# Patient Record
Sex: FEMALE | Race: WHITE | NOT HISPANIC OR LATINO
[De-identification: names, ages, dates, MRNs, and addresses within clinical notes are randomized per-mention and may not be internally consistent; named-entity substitution may affect disease eponyms.]

---

## 2017-02-23 ENCOUNTER — MEDICATION RENEWAL (OUTPATIENT)
Age: 47
End: 2017-02-23

## 2017-03-07 ENCOUNTER — CLINICAL ADVICE (OUTPATIENT)
Age: 47
End: 2017-03-07

## 2017-04-07 ENCOUNTER — MEDICATION RENEWAL (OUTPATIENT)
Age: 47
End: 2017-04-07

## 2017-04-12 ENCOUNTER — MEDICATION RENEWAL (OUTPATIENT)
Age: 47
End: 2017-04-12

## 2017-04-14 ENCOUNTER — MEDICATION RENEWAL (OUTPATIENT)
Age: 47
End: 2017-04-14

## 2017-05-11 ENCOUNTER — APPOINTMENT (OUTPATIENT)
Dept: ENDOCRINOLOGY | Facility: CLINIC | Age: 47
End: 2017-05-11

## 2017-05-11 VITALS
SYSTOLIC BLOOD PRESSURE: 104 MMHG | HEART RATE: 70 BPM | DIASTOLIC BLOOD PRESSURE: 64 MMHG | BODY MASS INDEX: 24.88 KG/M2 | HEIGHT: 63 IN | WEIGHT: 140.44 LBS

## 2017-05-11 RX ORDER — METFORMIN HYDROCHLORIDE 500 MG/1
500 TABLET, COATED ORAL TWICE DAILY
Qty: 180 | Refills: 1 | Status: DISCONTINUED | COMMUNITY
Start: 2017-03-07 | End: 2017-05-11

## 2017-05-12 LAB — TSH SERPL-ACNC: 0.65 UIU/ML

## 2017-06-12 ENCOUNTER — RX RENEWAL (OUTPATIENT)
Age: 47
End: 2017-06-12

## 2017-12-05 ENCOUNTER — APPOINTMENT (OUTPATIENT)
Dept: ENDOCRINOLOGY | Facility: CLINIC | Age: 47
End: 2017-12-05
Payer: COMMERCIAL

## 2017-12-05 VITALS
WEIGHT: 146 LBS | BODY MASS INDEX: 25.87 KG/M2 | DIASTOLIC BLOOD PRESSURE: 75 MMHG | HEART RATE: 82 BPM | HEIGHT: 63 IN | SYSTOLIC BLOOD PRESSURE: 121 MMHG

## 2017-12-05 DIAGNOSIS — Z00.00 ENCOUNTER FOR GENERAL ADULT MEDICAL EXAMINATION W/OUT ABNORMAL FINDINGS: ICD-10-CM

## 2017-12-05 PROCEDURE — 99213 OFFICE O/P EST LOW 20 MIN: CPT | Mod: 25

## 2017-12-05 PROCEDURE — 36415 COLL VENOUS BLD VENIPUNCTURE: CPT

## 2017-12-07 LAB
24R-OH-CALCIDIOL SERPL-MCNC: 40.2 PG/ML
25(OH)D3 SERPL-MCNC: 19.2 NG/ML
ANION GAP SERPL CALC-SCNC: 15 MMOL/L
BUN SERPL-MCNC: 15 MG/DL
CALCIUM SERPL-MCNC: 10.2 MG/DL
CALCIUM SERPL-MCNC: 10.2 MG/DL
CHLORIDE SERPL-SCNC: 99 MMOL/L
CO2 SERPL-SCNC: 28 MMOL/L
CREAT SERPL-MCNC: 0.56 MG/DL
FERRITIN SERPL-MCNC: 53 NG/ML
GLUCOSE SERPL-MCNC: 107 MG/DL
HCT VFR BLD CALC: 39.4 %
HGB BLD-MCNC: 12.8 G/DL
PARATHYROID HORMONE INTACT: 22 PG/ML
POTASSIUM SERPL-SCNC: 4.5 MMOL/L
SODIUM SERPL-SCNC: 142 MMOL/L
TSH SERPL-ACNC: 1.12 UIU/ML

## 2018-01-08 ENCOUNTER — RX RENEWAL (OUTPATIENT)
Age: 48
End: 2018-01-08

## 2018-06-04 ENCOUNTER — RX RENEWAL (OUTPATIENT)
Age: 48
End: 2018-06-04

## 2018-11-07 ENCOUNTER — TRANSCRIPTION ENCOUNTER (OUTPATIENT)
Age: 48
End: 2018-11-07

## 2019-05-08 ENCOUNTER — APPOINTMENT (OUTPATIENT)
Dept: ENDOCRINOLOGY | Facility: CLINIC | Age: 49
End: 2019-05-08

## 2019-06-03 ENCOUNTER — RX RENEWAL (OUTPATIENT)
Age: 49
End: 2019-06-03

## 2019-07-10 ENCOUNTER — APPOINTMENT (OUTPATIENT)
Dept: INTERNAL MEDICINE | Facility: CLINIC | Age: 49
End: 2019-07-10

## 2019-07-19 ENCOUNTER — RX RENEWAL (OUTPATIENT)
Age: 49
End: 2019-07-19

## 2019-08-06 ENCOUNTER — APPOINTMENT (OUTPATIENT)
Dept: INTERNAL MEDICINE | Facility: CLINIC | Age: 49
End: 2019-08-06

## 2019-08-21 ENCOUNTER — RX RENEWAL (OUTPATIENT)
Age: 49
End: 2019-08-21

## 2019-09-10 ENCOUNTER — APPOINTMENT (OUTPATIENT)
Dept: INTERNAL MEDICINE | Facility: CLINIC | Age: 49
End: 2019-09-10
Payer: COMMERCIAL

## 2019-09-10 VITALS
TEMPERATURE: 99.5 F | SYSTOLIC BLOOD PRESSURE: 118 MMHG | HEART RATE: 72 BPM | DIASTOLIC BLOOD PRESSURE: 60 MMHG | HEIGHT: 63 IN | BODY MASS INDEX: 25.69 KG/M2 | WEIGHT: 145 LBS

## 2019-09-10 DIAGNOSIS — Z80.0 FAMILY HISTORY OF MALIGNANT NEOPLASM OF DIGESTIVE ORGANS: ICD-10-CM

## 2019-09-10 DIAGNOSIS — N20.0 CALCULUS OF KIDNEY: ICD-10-CM

## 2019-09-10 PROCEDURE — 99204 OFFICE O/P NEW MOD 45 MIN: CPT

## 2019-09-10 RX ORDER — BUPROPION HYDROCHLORIDE 100 MG/1
100 TABLET, FILM COATED, EXTENDED RELEASE ORAL
Qty: 45 | Refills: 0 | Status: DISCONTINUED | COMMUNITY
Start: 2017-05-11 | End: 2019-09-10

## 2019-09-10 NOTE — HISTORY OF PRESENT ILLNESS
[FreeTextEntry8] : here to establish care  , has anxiety  for a while ,now interfering with her life (single mother of 3 yo twin girls)\par moved to [place] last year

## 2019-09-17 ENCOUNTER — CLINICAL ADVICE (OUTPATIENT)
Age: 49
End: 2019-09-17

## 2019-09-18 ENCOUNTER — RX RENEWAL (OUTPATIENT)
Age: 49
End: 2019-09-18

## 2019-09-25 ENCOUNTER — RX RENEWAL (OUTPATIENT)
Age: 49
End: 2019-09-25

## 2019-10-08 ENCOUNTER — APPOINTMENT (OUTPATIENT)
Dept: INTERNAL MEDICINE | Facility: CLINIC | Age: 49
End: 2019-10-08
Payer: COMMERCIAL

## 2019-10-08 VITALS
WEIGHT: 143 LBS | HEIGHT: 63 IN | TEMPERATURE: 98.4 F | SYSTOLIC BLOOD PRESSURE: 122 MMHG | DIASTOLIC BLOOD PRESSURE: 70 MMHG | BODY MASS INDEX: 25.34 KG/M2 | HEART RATE: 68 BPM

## 2019-10-08 DIAGNOSIS — E88.81 METABOLIC SYNDROME: ICD-10-CM

## 2019-10-08 DIAGNOSIS — Z00.01 ENCOUNTER FOR GENERAL ADULT MEDICAL EXAMINATION WITH ABNORMAL FINDINGS: ICD-10-CM

## 2019-10-08 DIAGNOSIS — Z76.89 PERSONS ENCOUNTERING HEALTH SERVICES IN OTHER SPECIFIED CIRCUMSTANCES: ICD-10-CM

## 2019-10-08 PROCEDURE — 90674 CCIIV4 VAC NO PRSV 0.5 ML IM: CPT

## 2019-10-08 PROCEDURE — G0008: CPT

## 2019-10-08 PROCEDURE — 99396 PREV VISIT EST AGE 40-64: CPT | Mod: 25

## 2019-10-08 PROCEDURE — 36415 COLL VENOUS BLD VENIPUNCTURE: CPT

## 2019-10-08 NOTE — HISTORY OF PRESENT ILLNESS
[de-identified] : here for annual exam doing well, \par concerned about cardiac risk factors and need  for further eval

## 2019-10-08 NOTE — PHYSICAL EXAM
[Normal] : no joint swelling, grossly normal strength/tone [de-identified] : no LLE pathology [de-identified] : bilat normal. no lumps [de-identified] : no rashes

## 2019-10-08 NOTE — HEALTH RISK ASSESSMENT
[Good] : ~his/her~  mood as  good [Yes] : Yes [0] : 2) Feeling down, depressed, or hopeless: Not at all (0) [Patient reported mammogram was normal] : Patient reported mammogram was normal [Patient reported PAP Smear was normal] : Patient reported PAP Smear was normal [HIV test declined] : HIV test declined [With Family] : lives with family [Employed] : employed [College] : College [Single] : single [Feels Safe at Home] : Feels safe at home [With Patient/Caregiver] : With Patient/Caregiver [Name: ___] : Health Care Proxy's Name: [unfilled]  [Relationship: ___] : Relationship: [unfilled] [Designated Healthcare Proxy] : Designated healthcare proxy [] : No [[id number]] : 0 [MammogramDate] : 01/19 [MammogramComments] : dr Padilla [PapSmearDate] : 01/19 [AdvancecareDate] : 10/08/19

## 2019-10-09 LAB
ALBUMIN SERPL ELPH-MCNC: 4.3 G/DL
ALP BLD-CCNC: 51 U/L
ALT SERPL-CCNC: 10 U/L
ANION GAP SERPL CALC-SCNC: 9 MMOL/L
APPEARANCE: CLEAR
AST SERPL-CCNC: 12 U/L
BASOPHILS # BLD AUTO: 0.05 K/UL
BASOPHILS NFR BLD AUTO: 0.9 %
BILIRUB SERPL-MCNC: 0.3 MG/DL
BILIRUBIN URINE: NEGATIVE
BLOOD URINE: NEGATIVE
BUN SERPL-MCNC: 12 MG/DL
CALCIUM SERPL-MCNC: 9.2 MG/DL
CHLORIDE SERPL-SCNC: 103 MMOL/L
CHOLEST SERPL-MCNC: 214 MG/DL
CHOLEST/HDLC SERPL: 4.4 RATIO
CO2 SERPL-SCNC: 27 MMOL/L
COLOR: YELLOW
CREAT SERPL-MCNC: 0.65 MG/DL
EOSINOPHIL # BLD AUTO: 0.26 K/UL
EOSINOPHIL NFR BLD AUTO: 4.6 %
ESTIMATED AVERAGE GLUCOSE: 114 MG/DL
GLUCOSE QUALITATIVE U: NEGATIVE
GLUCOSE SERPL-MCNC: 105 MG/DL
HBA1C MFR BLD HPLC: 5.6 %
HBV SURFACE AB SER QL: NONREACTIVE
HCT VFR BLD CALC: 38.4 %
HDLC SERPL-MCNC: 49 MG/DL
HGB BLD-MCNC: 12 G/DL
IMM GRANULOCYTES NFR BLD AUTO: 0.2 %
KETONES URINE: NEGATIVE
LDLC SERPL CALC-MCNC: 145 MG/DL
LEUKOCYTE ESTERASE URINE: NEGATIVE
LYMPHOCYTES # BLD AUTO: 2.46 K/UL
LYMPHOCYTES NFR BLD AUTO: 43.5 %
MAN DIFF?: NORMAL
MCHC RBC-ENTMCNC: 28.8 PG
MCHC RBC-ENTMCNC: 31.3 GM/DL
MCV RBC AUTO: 92.3 FL
MONOCYTES # BLD AUTO: 0.45 K/UL
MONOCYTES NFR BLD AUTO: 8 %
NEUTROPHILS # BLD AUTO: 2.43 K/UL
NEUTROPHILS NFR BLD AUTO: 42.8 %
NITRITE URINE: NEGATIVE
PH URINE: 7.5
PLATELET # BLD AUTO: 263 K/UL
POTASSIUM SERPL-SCNC: 3.8 MMOL/L
PROT SERPL-MCNC: 6.7 G/DL
PROTEIN URINE: NORMAL
RBC # BLD: 4.16 M/UL
RBC # FLD: 12.4 %
SODIUM SERPL-SCNC: 139 MMOL/L
SPECIFIC GRAVITY URINE: 1.02
T4 FREE SERPL-MCNC: 1.8 NG/DL
TRIGL SERPL-MCNC: 98 MG/DL
TSH SERPL-ACNC: 0.36 UIU/ML
UROBILINOGEN URINE: NORMAL
WBC # FLD AUTO: 5.66 K/UL

## 2019-10-16 ENCOUNTER — CLINICAL ADVICE (OUTPATIENT)
Age: 49
End: 2019-10-16

## 2019-10-25 ENCOUNTER — RX RENEWAL (OUTPATIENT)
Age: 49
End: 2019-10-25

## 2019-12-16 ENCOUNTER — RX RENEWAL (OUTPATIENT)
Age: 49
End: 2019-12-16

## 2020-03-04 ENCOUNTER — RX RENEWAL (OUTPATIENT)
Age: 50
End: 2020-03-04

## 2020-03-06 ENCOUNTER — RX RENEWAL (OUTPATIENT)
Age: 50
End: 2020-03-06

## 2020-06-03 ENCOUNTER — RX RENEWAL (OUTPATIENT)
Age: 50
End: 2020-06-03

## 2020-06-19 ENCOUNTER — APPOINTMENT (OUTPATIENT)
Dept: INTERNAL MEDICINE | Facility: CLINIC | Age: 50
End: 2020-06-19
Payer: COMMERCIAL

## 2020-06-19 VITALS
DIASTOLIC BLOOD PRESSURE: 60 MMHG | SYSTOLIC BLOOD PRESSURE: 96 MMHG | HEIGHT: 63 IN | HEART RATE: 89 BPM | WEIGHT: 145 LBS | BODY MASS INDEX: 25.69 KG/M2 | TEMPERATURE: 98.8 F | OXYGEN SATURATION: 98 %

## 2020-06-19 DIAGNOSIS — Z11.59 ENCOUNTER FOR SCREENING FOR OTHER VIRAL DISEASES: ICD-10-CM

## 2020-06-19 DIAGNOSIS — G56.22 LESION OF ULNAR NERVE, LEFT UPPER LIMB: ICD-10-CM

## 2020-06-19 PROCEDURE — 36415 COLL VENOUS BLD VENIPUNCTURE: CPT

## 2020-06-19 PROCEDURE — 99213 OFFICE O/P EST LOW 20 MIN: CPT

## 2020-06-19 RX ORDER — ESCITALOPRAM OXALATE 10 MG/1
10 TABLET ORAL DAILY
Qty: 90 | Refills: 0 | Status: DISCONTINUED | COMMUNITY
Start: 2019-09-10 | End: 2020-06-19

## 2020-06-19 RX ORDER — DULOXETINE HYDROCHLORIDE 30 MG/1
30 CAPSULE, DELAYED RELEASE ORAL
Qty: 90 | Refills: 0 | Status: ACTIVE | COMMUNITY
Start: 2020-06-19

## 2020-06-19 NOTE — PHYSICAL EXAM
[No Edema] : there was no peripheral edema [Normal] : normal rate, regular rhythm, normal S1 and S2 and no murmur heard [Coordination Grossly Intact] : coordination grossly intact [No Joint Swelling] : no joint swelling [Grossly Normal Strength/Tone] : grossly normal strength/tone [de-identified] : L axillary lump nontender [de-identified] : ulnar neuropathy

## 2020-06-19 NOTE — HISTORY OF PRESENT ILLNESS
[de-identified] : here for medical evaluation for possible Synthroid  dose increased, was seen by cardiologist after brother had heart attack at age 42,\par she also noted a L axillary lump , seen by dr Espino and is scheduled to get mammo and US\par  also needs neuro to do EMG for ulnar neuropathy

## 2020-06-19 NOTE — REVIEW OF SYSTEMS
[Fever] : no fever [Fatigue] : fatigue [Chills] : no chills [Anxiety] : anxiety [Negative] : Respiratory [de-identified] : L axillary lump [FreeTextEntry9] : fingers numbness

## 2020-06-22 LAB
SARS-COV-2 IGG SERPL IA-ACNC: <0.1 INDEX
SARS-COV-2 IGG SERPL QL IA: NEGATIVE
T4 FREE SERPL-MCNC: 1.4 NG/DL
TSH SERPL-ACNC: 1.88 UIU/ML

## 2020-07-31 ENCOUNTER — APPOINTMENT (OUTPATIENT)
Dept: FAMILY MEDICINE | Facility: CLINIC | Age: 50
End: 2020-07-31
Payer: COMMERCIAL

## 2020-07-31 VITALS
SYSTOLIC BLOOD PRESSURE: 120 MMHG | DIASTOLIC BLOOD PRESSURE: 76 MMHG | TEMPERATURE: 98.8 F | WEIGHT: 141 LBS | HEIGHT: 63 IN | BODY MASS INDEX: 24.98 KG/M2

## 2020-07-31 PROCEDURE — 99215 OFFICE O/P EST HI 40 MIN: CPT

## 2020-07-31 RX ORDER — FLUTICASONE PROPIONATE 50 UG/1
50 SPRAY, METERED NASAL TWICE DAILY
Qty: 1 | Refills: 5 | Status: DISCONTINUED | COMMUNITY
Start: 2020-03-12 | End: 2020-07-31

## 2020-07-31 NOTE — PHYSICAL EXAM
[No Lymphadenopathy] : no lymphadenopathy [PERRL] : pupils equal round and reactive to light [Clear to Auscultation] : lungs were clear to auscultation bilaterally [Thyroid Normal, No Nodules] : the thyroid was normal and there were no nodules present [Normal Rate] : normal rate  [Regular Rhythm] : with a regular rhythm [No Focal Deficits] : no focal deficits [No Carotid Bruits] : no carotid bruits [Normal] : affect was normal and insight and judgment were intact [de-identified] : negative Romberg; normal finger to nose. Able to walk toe-heel. [de-identified] : strong carotid pp [de-identified] : No horizontal nystagmus

## 2020-07-31 NOTE — PLAN
[FreeTextEntry1] : Reassurance.\par Use meclizine as needed\par Avoid getting up too quickly or sudden head turns.\par Neuro f/u if her sxs persist or worsen.\par \par

## 2020-07-31 NOTE — REVIEW OF SYSTEMS
[Headache] : headache [Anxiety] : anxiety [Dizziness] : dizziness [Negative] : Respiratory [Vision Problems] : no vision problems [de-identified] : numbness of the left side of her face

## 2020-07-31 NOTE — HEALTH RISK ASSESSMENT
[No] : In the past 12 months have you used drugs other than those required for medical reasons? No [No falls in past year] : Patient reported no falls in the past year [0] : 2) Feeling down, depressed, or hopeless: Not at all (0) [] : No [de-identified] : walking [de-identified] : normal

## 2020-09-03 ENCOUNTER — APPOINTMENT (OUTPATIENT)
Dept: NEUROLOGY | Facility: CLINIC | Age: 50
End: 2020-09-03
Payer: COMMERCIAL

## 2020-09-03 DIAGNOSIS — R20.2 ANESTHESIA OF SKIN: ICD-10-CM

## 2020-09-03 DIAGNOSIS — R20.0 ANESTHESIA OF SKIN: ICD-10-CM

## 2020-09-03 PROCEDURE — 95909 NRV CNDJ TST 5-6 STUDIES: CPT

## 2020-09-03 PROCEDURE — 95886 MUSC TEST DONE W/N TEST COMP: CPT

## 2020-09-17 ENCOUNTER — RX RENEWAL (OUTPATIENT)
Age: 50
End: 2020-09-17

## 2020-09-21 ENCOUNTER — RX RENEWAL (OUTPATIENT)
Age: 50
End: 2020-09-21

## 2020-11-11 ENCOUNTER — APPOINTMENT (OUTPATIENT)
Dept: GASTROENTEROLOGY | Facility: CLINIC | Age: 50
End: 2020-11-11
Payer: COMMERCIAL

## 2020-11-11 DIAGNOSIS — Z12.11 ENCOUNTER FOR SCREENING FOR MALIGNANT NEOPLASM OF COLON: ICD-10-CM

## 2020-11-11 DIAGNOSIS — K21.9 GASTRO-ESOPHAGEAL REFLUX DISEASE W/OUT ESOPHAGITIS: ICD-10-CM

## 2020-11-11 PROCEDURE — 99214 OFFICE O/P EST MOD 30 MIN: CPT | Mod: 95

## 2020-11-11 RX ORDER — OMEPRAZOLE 40 MG/1
40 CAPSULE, DELAYED RELEASE ORAL
Qty: 30 | Refills: 2 | Status: ACTIVE | COMMUNITY
Start: 2020-11-11 | End: 1900-01-01

## 2020-11-11 NOTE — HISTORY OF PRESENT ILLNESS
[FreeTextEntry1] : A verbal consent was previously obtained from the patient for this specific telehealth visit (see attached document). Visit done from my office in [place], [place].\par \par \par Time started: 1139\par Time ended: 1154\par Total time (minutes):  30 and > 50% of the time spent in the encounter involved counseling and coordination of care for any or all of the diagnoses submitted \par \par CHIEF COMPLAINT: \par gerd, colon screening\par \par HPI:  \par 50f anxiety, HLD, hypothyroid for evalution of GERD - feels severe burning in chest with pain w/ swallowing x 1 wk.  Had similar after doxy several years ago, but no provoking medication on this occasion.  Sx sever, daily, improve w/ prilosec otc, but increased to bid, and still uncomforatable.  Sx mainly prandial.  No excertional component.  Burning quality.  \par \par No prior colonscopy.   Pt denies abdominal pain, rectal bleeding, change in bowel habits, or unexplained weight loss.  \par \par Soc:  no tobacco or significant EtOH\par FHx: no FHx GI malignancy or IBD\par \par ROS:\par Constitutional:: no weight loss, fevers\par ENT: no deafness\par Eyes: not blind\par Neck: no LN\par Chest: no dyspnea/cough\par Cardiac: no chest pain\par Vascular: no leg swelling\par GI: no abdominal pain, nausea, vomiting, diarrhea, constipation, rectal bleeding, dysphagia, melena unless otherwise noted in HPI\par : no dysuria, dark urine\par Skin: no rashes, jaundice\par Heme: no bleeding\par Endocrine: no DM unless otherwise stated in HPI\par \par Px: telehealth.  NAD\par \par Labs/imaging/prior endoscopic results reviewed to the extent available and noted in HP.  IA verbal consent was previously obtained from the patient for this specific telehealth visit (see attached document). Visit done from my office in [place], [place].   Pt at home. Others present: none. Privacy concerns addressed.\par \par

## 2020-11-11 NOTE — ASSESSMENT
[FreeTextEntry1] : GERD - Reviewed dietary and lifestyle modifications, including weight loss, smaller/frequent/earlier (>3h prior to lying down) meals, trials of cutting down/out caffeine, chocolate, tomatoes, fatty foods, alcohol.  EGD at time of colonoscopy.  Trial carafate as well.\par \par Colon cancer screening - colonoscopy due - Colonoscopy scheduled - Risks, benefits, alternatives were discussed, including but not limited to bleeding, infection, perforation and sedation risks. Additionally, the possibility of missed lesions was conveyed.\par \par

## 2020-12-27 ENCOUNTER — RESULT REVIEW (OUTPATIENT)
Age: 50
End: 2020-12-27

## 2020-12-28 ENCOUNTER — RESULT REVIEW (OUTPATIENT)
Age: 50
End: 2020-12-28

## 2020-12-29 ENCOUNTER — APPOINTMENT (OUTPATIENT)
Dept: GASTROENTEROLOGY | Facility: HOSPITAL | Age: 50
End: 2020-12-29

## 2020-12-29 ENCOUNTER — RESULT REVIEW (OUTPATIENT)
Age: 50
End: 2020-12-29

## 2021-01-29 ENCOUNTER — RX RENEWAL (OUTPATIENT)
Age: 51
End: 2021-01-29

## 2021-03-17 ENCOUNTER — APPOINTMENT (OUTPATIENT)
Dept: INTERNAL MEDICINE | Facility: CLINIC | Age: 51
End: 2021-03-17
Payer: COMMERCIAL

## 2021-03-17 DIAGNOSIS — R42 DIZZINESS AND GIDDINESS: ICD-10-CM

## 2021-03-17 PROCEDURE — 99213 OFFICE O/P EST LOW 20 MIN: CPT | Mod: 95

## 2021-03-17 NOTE — REVIEW OF SYSTEMS
[Abdominal Pain] : no abdominal pain [Nausea] : no nausea [Vomiting] : no vomiting [Headache] : no headache [Dizziness] : dizziness [Memory Loss] : no memory loss [Negative] : Respiratory [de-identified] : see HPI

## 2021-03-17 NOTE — HISTORY OF PRESENT ILLNESS
[Home] : at home, [unfilled] , at the time of the visit. [Medical Office: ([facility])___] : at the medical office located in  [Verbal consent obtained from patient] : the patient, [unfilled] [de-identified] : c/o vertigo , now more often than  prior , when she changes positions and in  bed, no vomiting, no falls , \par Meclizine does not help , \par needs Synthroid refill

## 2021-04-20 ENCOUNTER — RX RENEWAL (OUTPATIENT)
Age: 51
End: 2021-04-20

## 2021-06-16 ENCOUNTER — RX RENEWAL (OUTPATIENT)
Age: 51
End: 2021-06-16

## 2021-06-23 ENCOUNTER — APPOINTMENT (OUTPATIENT)
Dept: INTERNAL MEDICINE | Facility: CLINIC | Age: 51
End: 2021-06-23
Payer: COMMERCIAL

## 2021-06-23 VITALS
HEART RATE: 103 BPM | TEMPERATURE: 97.5 F | HEIGHT: 63 IN | DIASTOLIC BLOOD PRESSURE: 60 MMHG | BODY MASS INDEX: 25.16 KG/M2 | SYSTOLIC BLOOD PRESSURE: 112 MMHG | WEIGHT: 142 LBS | OXYGEN SATURATION: 97 %

## 2021-06-23 DIAGNOSIS — E06.3 AUTOIMMUNE THYROIDITIS: ICD-10-CM

## 2021-06-23 DIAGNOSIS — E78.2 MIXED HYPERLIPIDEMIA: ICD-10-CM

## 2021-06-23 DIAGNOSIS — R09.81 NASAL CONGESTION: ICD-10-CM

## 2021-06-23 DIAGNOSIS — F41.9 ANXIETY DISORDER, UNSPECIFIED: ICD-10-CM

## 2021-06-23 PROCEDURE — 99072 ADDL SUPL MATRL&STAF TM PHE: CPT

## 2021-06-23 PROCEDURE — 99213 OFFICE O/P EST LOW 20 MIN: CPT

## 2021-06-23 RX ORDER — SUCRALFATE 1 G/10ML
1 SUSPENSION ORAL
Qty: 600 | Refills: 0 | Status: DISCONTINUED | COMMUNITY
Start: 2020-11-11 | End: 2021-06-23

## 2021-06-23 RX ORDER — MECLIZINE HYDROCHLORIDE 12.5 MG/1
12.5 TABLET ORAL
Qty: 30 | Refills: 0 | Status: DISCONTINUED | COMMUNITY
Start: 2020-07-31 | End: 2021-06-23

## 2021-06-23 NOTE — HISTORY OF PRESENT ILLNESS
[FreeTextEntry8] : c/o 10 days congestion, sinus pressure and nasal d/c , now more pressure in L ear, denies hearing loss, pain or fever

## 2021-06-23 NOTE — REVIEW OF SYSTEMS
[Nasal Discharge] : nasal discharge [Postnasal Drip] : postnasal drip [Negative] : Respiratory [Fever] : no fever [Chills] : no chills [Earache] : no earache [Hearing Loss] : no hearing loss [Sore Throat] : no sore throat

## 2021-06-23 NOTE — PHYSICAL EXAM
[Normal TMs] : both tympanic membranes were normal [Normal] : normal gait, coordination grossly intact, no focal deficits and deep tendon reflexes were 2+ and symmetric

## 2021-07-02 ENCOUNTER — RX RENEWAL (OUTPATIENT)
Age: 51
End: 2021-07-02

## 2021-08-30 ENCOUNTER — RX RENEWAL (OUTPATIENT)
Age: 51
End: 2021-08-30

## 2021-09-13 ENCOUNTER — RX RENEWAL (OUTPATIENT)
Age: 51
End: 2021-09-13

## 2021-09-22 ENCOUNTER — RX RENEWAL (OUTPATIENT)
Age: 51
End: 2021-09-22

## 2021-12-02 ENCOUNTER — NON-APPOINTMENT (OUTPATIENT)
Age: 51
End: 2021-12-02

## 2021-12-03 ENCOUNTER — APPOINTMENT (OUTPATIENT)
Dept: FAMILY MEDICINE | Facility: CLINIC | Age: 51
End: 2021-12-03
Payer: COMMERCIAL

## 2021-12-03 VITALS
SYSTOLIC BLOOD PRESSURE: 118 MMHG | DIASTOLIC BLOOD PRESSURE: 80 MMHG | TEMPERATURE: 97.4 F | WEIGHT: 142 LBS | HEART RATE: 100 BPM | RESPIRATION RATE: 16 BRPM | HEIGHT: 63 IN | OXYGEN SATURATION: 99 % | BODY MASS INDEX: 25.16 KG/M2

## 2021-12-03 DIAGNOSIS — R09.82 POSTNASAL DRIP: ICD-10-CM

## 2021-12-03 DIAGNOSIS — J01.90 ACUTE SINUSITIS, UNSPECIFIED: ICD-10-CM

## 2021-12-03 DIAGNOSIS — B96.89 ACUTE SINUSITIS, UNSPECIFIED: ICD-10-CM

## 2021-12-03 PROCEDURE — 99213 OFFICE O/P EST LOW 20 MIN: CPT

## 2021-12-03 RX ORDER — FLUTICASONE FUROATE 27.5 UG/1
27.5 SPRAY, METERED NASAL DAILY
Qty: 1 | Refills: 2 | Status: COMPLETED | COMMUNITY
Start: 2021-12-03 | End: 2022-03-03

## 2021-12-03 RX ORDER — AMOXICILLIN AND CLAVULANATE POTASSIUM 500; 125 MG/1; MG/1
500-125 TABLET, FILM COATED ORAL EVERY 8 HOURS
Qty: 30 | Refills: 0 | Status: COMPLETED | COMMUNITY
Start: 2021-12-03 | End: 2021-12-13

## 2021-12-03 RX ORDER — METHYLPREDNISOLONE 4 MG/1
4 TABLET ORAL
Qty: 1 | Refills: 0 | Status: COMPLETED | COMMUNITY
Start: 2021-06-23 | End: 2021-12-03

## 2021-12-13 ENCOUNTER — RX RENEWAL (OUTPATIENT)
Age: 51
End: 2021-12-13

## 2021-12-28 ENCOUNTER — RX RENEWAL (OUTPATIENT)
Age: 51
End: 2021-12-28

## 2021-12-28 RX ORDER — LEVOTHYROXINE SODIUM 125 UG/1
125 TABLET ORAL DAILY
Qty: 90 | Refills: 0 | Status: ACTIVE | COMMUNITY
Start: 2017-03-07 | End: 1900-01-01

## 2022-02-24 ENCOUNTER — RX RENEWAL (OUTPATIENT)
Age: 52
End: 2022-02-24

## 2022-03-11 ENCOUNTER — RX RENEWAL (OUTPATIENT)
Age: 52
End: 2022-03-11

## 2022-04-11 PROBLEM — Z11.59 SCREENING FOR VIRAL DISEASE: Status: ACTIVE | Noted: 2020-06-19

## 2022-05-17 ENCOUNTER — RX RENEWAL (OUTPATIENT)
Age: 52
End: 2022-05-17

## 2022-08-03 ENCOUNTER — RX RENEWAL (OUTPATIENT)
Age: 52
End: 2022-08-03

## 2022-08-03 RX ORDER — ROSUVASTATIN CALCIUM 5 MG/1
5 TABLET, FILM COATED ORAL
Qty: 90 | Refills: 0 | Status: ACTIVE | COMMUNITY
Start: 2020-06-19 | End: 1900-01-01

## 2022-10-25 ENCOUNTER — RX RENEWAL (OUTPATIENT)
Age: 52
End: 2022-10-25

## 2023-08-09 ENCOUNTER — RX ONLY (RX ONLY)
Age: 53
End: 2023-08-09

## 2023-08-09 ENCOUNTER — OFFICE (OUTPATIENT)
Dept: URBAN - METROPOLITAN AREA CLINIC 29 | Facility: CLINIC | Age: 53
Setting detail: OPHTHALMOLOGY
End: 2023-08-09
Payer: COMMERCIAL

## 2023-08-09 DIAGNOSIS — H52.13: ICD-10-CM

## 2023-08-09 DIAGNOSIS — Z01.00: ICD-10-CM

## 2023-08-09 PROBLEM — H16.223 DRY EYE SYNDROME K SICCA; BOTH EYES: Status: ACTIVE | Noted: 2023-08-09

## 2023-08-09 PROCEDURE — 92004 COMPRE OPH EXAM NEW PT 1/>: CPT | Performed by: OPTOMETRIST

## 2023-08-09 ASSESSMENT — REFRACTION_CURRENTRX
OS_OVR_VA: 20/
OS_SPHERE: -6.00
OS_ADD: +2.75
OD_AXIS: 092
OD_OVR_VA: 20/
OS_CYLINDER: +1.75
OD_ADD: +2.75
OD_SPHERE: -6.25
OS_AXIS: 108
OD_CYLINDER: +0.75

## 2023-08-09 ASSESSMENT — REFRACTION_MANIFEST
OS_SPHERE: -5.00
OD_ADD: +2.50
OS_VA1: 20/20
OD_CYLINDER: SPHERE
OS_CYLINDER: +0.50
OS_ADD: +2.50
OD_VA1: 20/20
OD_SPHERE: -5.75
OS_AXIS: 120

## 2023-08-09 ASSESSMENT — REFRACTION_AUTOREFRACTION
OD_AXIS: 012
OS_SPHERE: -4.75
OS_AXIS: 120
OD_CYLINDER: +0.25
OD_SPHERE: -5.50
OS_CYLINDER: +0.75

## 2023-08-09 ASSESSMENT — VISUAL ACUITY
OS_BCVA: 20/40-2
OD_BCVA: 20/25

## 2023-08-09 ASSESSMENT — TONOMETRY
OS_IOP_MMHG: 15
OD_IOP_MMHG: 14

## 2023-08-09 ASSESSMENT — SPHEQUIV_DERIVED
OD_SPHEQUIV: -5.375
OS_SPHEQUIV: -4.375
OS_SPHEQUIV: -4.75

## 2023-08-09 ASSESSMENT — CONFRONTATIONAL VISUAL FIELD TEST (CVF)
OD_FINDINGS: FULL
OS_FINDINGS: FULL

## 2023-08-09 ASSESSMENT — TEAR BREAK UP TIME (TBUT)
OS_TBUT: T
OD_TBUT: T